# Patient Record
Sex: MALE | Race: WHITE | Employment: FULL TIME | URBAN - METROPOLITAN AREA
[De-identification: names, ages, dates, MRNs, and addresses within clinical notes are randomized per-mention and may not be internally consistent; named-entity substitution may affect disease eponyms.]

---

## 2017-03-07 ENCOUNTER — OFFICE VISIT (OUTPATIENT)
Dept: FAMILY MEDICINE CLINIC | Facility: CLINIC | Age: 32
End: 2017-03-07

## 2017-03-07 VITALS
RESPIRATION RATE: 16 BRPM | SYSTOLIC BLOOD PRESSURE: 138 MMHG | HEIGHT: 69 IN | TEMPERATURE: 99 F | WEIGHT: 198 LBS | HEART RATE: 97 BPM | OXYGEN SATURATION: 97 % | DIASTOLIC BLOOD PRESSURE: 92 MMHG | BODY MASS INDEX: 29.33 KG/M2

## 2017-03-07 DIAGNOSIS — J01.00 ACUTE MAXILLARY SINUSITIS, RECURRENCE NOT SPECIFIED: Primary | ICD-10-CM

## 2017-03-07 PROCEDURE — 99213 OFFICE O/P EST LOW 20 MIN: CPT | Performed by: FAMILY MEDICINE

## 2017-03-07 RX ORDER — AZITHROMYCIN 250 MG/1
TABLET, FILM COATED ORAL
Qty: 6 TABLET | Refills: 0 | Status: SHIPPED | OUTPATIENT
Start: 2017-03-07 | End: 2018-05-16

## 2017-03-07 RX ORDER — FLUTICASONE PROPIONATE 50 MCG
2 SPRAY, SUSPENSION (ML) NASAL DAILY
Qty: 1 BOTTLE | Refills: 0 | Status: SHIPPED | OUTPATIENT
Start: 2017-03-07 | End: 2018-05-16

## 2017-03-08 NOTE — PROGRESS NOTES
HPI:   Billy Marin is a 36year old male who presents for upper respiratory symptoms for  1  weeks.  Patient reports sore throat, congestion, yellow colored nasal discharge, low grade fever, cough with greenish colored sputum, ear pain, sinus pain, OT gargles, tea with honey/lemon, antihistamine, flonase, mucinex prn and tylenol/motrin prn  - will tx with azithromycin and flonase, reviewed indications, dosing and SEs of medication  - monitor for worsening s/s  - patient indicates understanding of these

## 2018-02-24 ENCOUNTER — OFFICE VISIT (OUTPATIENT)
Dept: FAMILY MEDICINE CLINIC | Facility: CLINIC | Age: 33
End: 2018-02-24

## 2018-02-24 VITALS
SYSTOLIC BLOOD PRESSURE: 146 MMHG | WEIGHT: 202.38 LBS | HEIGHT: 68 IN | RESPIRATION RATE: 20 BRPM | HEART RATE: 103 BPM | DIASTOLIC BLOOD PRESSURE: 86 MMHG | BODY MASS INDEX: 30.67 KG/M2 | OXYGEN SATURATION: 99 % | TEMPERATURE: 99 F

## 2018-02-24 DIAGNOSIS — J06.9 UPPER RESPIRATORY TRACT INFECTION, UNSPECIFIED TYPE: Primary | ICD-10-CM

## 2018-02-24 PROCEDURE — 99213 OFFICE O/P EST LOW 20 MIN: CPT | Performed by: NURSE PRACTITIONER

## 2018-02-24 NOTE — PROGRESS NOTES
CHIEF COMPLAINT:   Patient presents with:  Cough/URI: x 2 days      HPI:   Antoinette Carpenter is a 29year old male who presents for upper respiratory symptoms for  2 days. Patient reports sore throat only at the beginning of sx's, congestion.  Symptoms have Erasmo Cuadra is a 29year old male who presents with upper respiratory symptoms that are consistent with    ASSESSMENT:   Upper respiratory tract infection, unspecified type  (primary encounter diagnosis)    PLAN: Meds as below.   Comfort care as descr · Decongestant medicines. Several types of decongestants are available without prescription. These may help reduce stuffy or runny nose symptoms. · Prescription or over-the-counter nasal sprays. These may help reduce nasal symptoms, including stuffiness. Call your healthcare provider right away if you have any of these:  · Fever of 100.4°F (38°C) or higher, or as directed  · Cough, chest pain, or shortness of breath that gets worse  · Symptoms don’t get better or get worse after about 10 days  · Headache,

## 2018-05-16 ENCOUNTER — OFFICE VISIT (OUTPATIENT)
Dept: FAMILY MEDICINE CLINIC | Facility: CLINIC | Age: 33
End: 2018-05-16

## 2018-05-16 VITALS
HEIGHT: 68 IN | WEIGHT: 198 LBS | RESPIRATION RATE: 18 BRPM | DIASTOLIC BLOOD PRESSURE: 72 MMHG | HEART RATE: 80 BPM | TEMPERATURE: 99 F | BODY MASS INDEX: 30.01 KG/M2 | SYSTOLIC BLOOD PRESSURE: 134 MMHG | OXYGEN SATURATION: 98 %

## 2018-05-16 DIAGNOSIS — H10.9 CONJUNCTIVITIS OF LEFT EYE, UNSPECIFIED CONJUNCTIVITIS TYPE: Primary | ICD-10-CM

## 2018-05-16 PROCEDURE — 99212 OFFICE O/P EST SF 10 MIN: CPT | Performed by: NURSE PRACTITIONER

## 2018-05-16 RX ORDER — POLYMYXIN B SULFATE AND TRIMETHOPRIM 1; 10000 MG/ML; [USP'U]/ML
1-2 SOLUTION OPHTHALMIC 4 TIMES DAILY
Qty: 10 ML | Refills: 0 | Status: SHIPPED | OUTPATIENT
Start: 2018-05-16 | End: 2018-05-23

## 2018-05-16 NOTE — PROGRESS NOTES
CHIEF COMPLAINT:   Patient presents with:  Eye Problem: woke up left eye feels like mucus in it, redness on lower portion of eye    HPI:   Nancy Stacy is a 34year old male who presents with chief complaint of \"pink eye\".  Symptoms began  1  days ag HENT: atraumatic, normocephalic,ears and throat are clear  NECK: supple, non tender  LUNGS: clear to auscultation bilaterally. CARDIO: RRR without murmur  LYMPH: no preauricular lymphadenopathy.  No cervical lymphadenopathy    ASSESSMENT AND PLAN:   Marielle · Apply a cold pack over the eye for 20 minutes at a time. This will reduce pain. To make a cold pack, put ice cubes in a plastic bag that seals at the top. Wrap the bag in a clean, thin towel or cloth.   · Artificial tears may be prescribed to reduce irrit

## 2022-10-14 ENCOUNTER — OFFICE VISIT (OUTPATIENT)
Dept: FAMILY MEDICINE CLINIC | Facility: CLINIC | Age: 37
End: 2022-10-14
Payer: COMMERCIAL

## 2022-10-14 DIAGNOSIS — Z23 NEED FOR VACCINATION: Primary | ICD-10-CM

## 2022-10-14 DIAGNOSIS — Z23 NEED FOR TDAP VACCINATION: ICD-10-CM

## 2022-10-14 PROCEDURE — 90715 TDAP VACCINE 7 YRS/> IM: CPT | Performed by: PHYSICIAN ASSISTANT

## 2022-10-14 PROCEDURE — 90471 IMMUNIZATION ADMIN: CPT | Performed by: PHYSICIAN ASSISTANT

## 2022-10-14 NOTE — PROGRESS NOTES
Patient requesting Tdap vaccination. [place] coming the end of this month. States his last Tdap was 10 years ago. No adverse reactions in the past to previous  vaccinations. Vaccination administration form filled out by patient and reviewed. Benefits and adverse reactions of vaccinations discussed. Patient advised to wait 20 minutes post vaccination to monitor for any immediate side effects. Vaccination given in left deltoid. Patient tolerated well without any issues. Vaccination information sheet also provided with the after visit summary . No questions or concerns at this time. Patient understands and agrees with plan.       Linette Banks PA-C  10/14/2022  9:20 AM

## (undated) NOTE — MR AVS SNAPSHOT
[facility] [employer]  [address] [place], [facility] [place] [id number]               Thank you for choosing us for your health care visit with Cortez Zambrano DO.   We are glad to serve you and happy to MyChart               Educational Information     Your blood pressure indicates you may be at-risk for Hypertension. Please consider the following Lifestyle Modifications. Also, please return for a follow-up Blood Pressure Check in 1 month.      Ronda Fraction